# Patient Record
Sex: FEMALE | Race: WHITE | NOT HISPANIC OR LATINO | Employment: FULL TIME | URBAN - METROPOLITAN AREA
[De-identification: names, ages, dates, MRNs, and addresses within clinical notes are randomized per-mention and may not be internally consistent; named-entity substitution may affect disease eponyms.]

---

## 2020-09-01 ENCOUNTER — PROCEDURE VISIT (OUTPATIENT)
Dept: OBSTETRICS AND GYNECOLOGY | Age: 55
End: 2020-09-01

## 2020-09-01 ENCOUNTER — OFFICE VISIT (OUTPATIENT)
Dept: OBSTETRICS AND GYNECOLOGY | Age: 55
End: 2020-09-01

## 2020-09-01 VITALS
SYSTOLIC BLOOD PRESSURE: 122 MMHG | HEIGHT: 63 IN | WEIGHT: 190.4 LBS | BODY MASS INDEX: 33.73 KG/M2 | DIASTOLIC BLOOD PRESSURE: 76 MMHG

## 2020-09-01 DIAGNOSIS — Z12.4 SCREENING FOR MALIGNANT NEOPLASM OF CERVIX: ICD-10-CM

## 2020-09-01 DIAGNOSIS — N93.9 ABNORMAL UTERINE BLEEDING (AUB): Primary | ICD-10-CM

## 2020-09-01 PROCEDURE — 99203 OFFICE O/P NEW LOW 30 MIN: CPT | Performed by: OBSTETRICS & GYNECOLOGY

## 2020-09-01 PROCEDURE — 58100 BIOPSY OF UTERUS LINING: CPT | Performed by: OBSTETRICS & GYNECOLOGY

## 2020-09-01 PROCEDURE — 76830 TRANSVAGINAL US NON-OB: CPT | Performed by: OBSTETRICS & GYNECOLOGY

## 2020-09-01 RX ORDER — METOPROLOL SUCCINATE 100 MG/1
TABLET, EXTENDED RELEASE ORAL
COMMUNITY
Start: 2020-06-11

## 2020-09-01 RX ORDER — TRAZODONE HYDROCHLORIDE 100 MG/1
TABLET ORAL
COMMUNITY
Start: 2020-07-16

## 2020-09-01 RX ORDER — EXENATIDE 2 MG/.85ML
INJECTION, SUSPENSION, EXTENDED RELEASE SUBCUTANEOUS
COMMUNITY
Start: 2020-07-23

## 2020-09-01 RX ORDER — LEVOTHYROXINE SODIUM 112 UG/1
TABLET ORAL
COMMUNITY
Start: 2020-07-17

## 2020-09-01 RX ORDER — BACLOFEN 10 MG/1
TABLET ORAL
COMMUNITY
Start: 2020-06-11

## 2020-09-01 RX ORDER — SITAGLIPTIN 100 MG/1
TABLET, FILM COATED ORAL
COMMUNITY
Start: 2020-07-16

## 2020-09-01 RX ORDER — DULOXETIN HYDROCHLORIDE 60 MG/1
CAPSULE, DELAYED RELEASE ORAL
COMMUNITY
Start: 2020-06-11

## 2020-09-01 RX ORDER — VENLAFAXINE HYDROCHLORIDE 225 MG/1
TABLET, EXTENDED RELEASE ORAL
COMMUNITY
Start: 2020-06-11

## 2020-09-01 NOTE — PROGRESS NOTES
New GYN Problem    Chief Complaint   Patient presents with   • Gynecologic Exam     NEW GYN ABNORMAL VAGINAL BLEEDING LAST PAP 3/23/17   • Establish Care       Maria L Mcnulty is a 55 yr old G0 who presents with vaginal bleeding. She had some cramping the beginning of August then a week later began having vaginal bleeding.  It is bright red, but it is not very heavy.  She continues to have some mild uterine cramping.  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  Things have been somewhat stressful due to COVID    Review of Systems   Constitutional: Negative for fever and unexpected weight change.   Respiratory: Negative for shortness of breath.    Cardiovascular: Negative for chest pain.   Gastrointestinal: Positive for constipation and diarrhea. Negative for abdominal pain.   Genitourinary: Negative for frequency and urgency.   Hematological: Negative for adenopathy.   Psychiatric/Behavioral: Negative for dysphoric mood.       Health Maintenance  Last mammogram: UTD march 2020- has had benign breast bx.  Last pap: 2017 normal, abnormal pap more than 10 yr ago and LEEP  c-scope 2015    Histories  Past Medical History:   Diagnosis Date   • Diabetes mellitus (CMS/HCC)    • Disease of thyroid gland    • GERD (gastroesophageal reflux disease)    • Hernia, hiatal    • Hyperlipidemia     RESOLVED    • Shoulder pain        Past Surgical History:   Procedure Laterality Date   • BREAST BIOPSY     • CERVICAL BIOPSY  W/ LOOP ELECTRODE EXCISION     • ENDOMETRIAL ABLATION     • THYROID BIOPSY         Family History   Problem Relation Age of Onset   • Uterine cancer Maternal Grandmother    • Ovarian cancer Maternal Grandmother    • Colon cancer Maternal Grandmother    • Breast cancer Paternal Aunt    • Bone cancer Paternal Aunt    • Brain cancer Paternal Aunt        Social History     Socioeconomic History   • Marital status:   "    Spouse name: Not on file   • Number of children: Not on file   • Years of education: Not on file   • Highest education level: Not on file   Tobacco Use   • Smoking status: Former Smoker   • Smokeless tobacco: Never Used   Substance and Sexual Activity   • Alcohol use: Yes     Comment: OCCASION   • Drug use: Never   • Sexual activity: Yes     Partners: Male     Birth control/protection: None       OB History        0    Para   0    Term   0       0    AB   0    Living   0       SAB   0    TAB   0    Ectopic   0    Molar   0    Multiple   0    Live Births   0                Physical Exam    /76   Ht 158.8 cm (62.52\")   Wt 86.4 kg (190 lb 6.4 oz)   LMP  (LMP Unknown)   Breastfeeding No   BMI 34.25 kg/m²     BMI: Body mass index is 34.25 kg/m².     General:   alert, appears stated age and cooperative   Neck Nontender, no lymphadenopathy, no thyromegaly   Lung lungs clear to auscultation, no wheezes or rhonchi   Heart: heart regular rate and rhythm, no murmurs   Abdomen: soft, non-tender, without masses or organomegaly and obese   Breast: inspection negative, no nipple discharge or bleeding, no masses or nodularity palpable   Urethra and bladder: urethral meatus normal; bladder nontender to palpation;   Vulva: normal, Bartholin's, Urethra, Penhook's normal   Vagina: normal mucosa, normal discharge   Cervix: no lesions and nulliparous appearance   Uterus: exam is limited habitus    Adnexa: exam limited by habitus       Informed consent was obtained and risks described as bleeding, cramping, uterine perforation, infection. She desired to proceed. A speculum was inserted and the cervix visualized and cleansed with betadine. A single tooth tenaculum was used to grasp the cervix. The pipelle was inserted and the plunger pulled back. The pipelle was rotated and withdrawn from the uterus with return of small amount of endometrial tissue. A total of two passes were made with the pipelle. The tenaculum " was then removed and hemostasis of the sites ensured.  She had discomfort with the procedure and it was difficult to obtain a good sample with the two passes. She would not tolerate further attempts  Tissue recovered was sent for pathologic examination.    Assessment/Plan    Problems Addressed this Visit     None      Visit Diagnoses     Abnormal uterine bleeding (AUB)    -  Primary    Relevant Orders    Follicle Stimulating Hormone    Estradiol    Reference Histopathology    Screening for malignant neoplasm of cervix        Relevant Orders    PapIG, HPV, Rfx 16 / 18        It is unclear if she is truly menopausal, and so FSH and estradiol ordered today to help determine this.  She has had hot flashes for a couple of years, and she has not had any menstrual bleeding since she had endometrial ablation. Her endometrium was very thin at 3.35 mm and EMB was obtained. She does have risk factors for endometrial cancer. If sampling inadequate, which is likely to be, if truly postmenopausal then recommend D&C  Since lining is very thin, may be able to monitor       Susie Mendoza MD  09/01/2020  11:48

## 2020-09-02 LAB
ESTRADIOL SERPL-MCNC: 33 PG/ML
FSH SERPL-ACNC: 38.1 MIU/ML

## 2020-09-03 LAB
DX ICD CODE: NORMAL
PATH REPORT.FINAL DX SPEC: NORMAL
PATH REPORT.GROSS SPEC: NORMAL
PATH REPORT.SITE OF ORIGIN SPEC: NORMAL
PATHOLOGIST NAME: NORMAL
PAYMENT PROCEDURE: NORMAL

## 2020-09-03 NOTE — PROGRESS NOTES
Attempted to call with results, unable to reach.  Her endometrium was very thin, goes along with having atrophic endometrium.  Might consider doing a course of Provera and would recommend following up in 3 to 4 months to see if her bleeding has resolved.  Also, it does appear as though she is in menopause.    Susie Mendoza MD  9/3/2020  12:34

## 2020-09-04 LAB
CYTOLOGIST CVX/VAG CYTO: NORMAL
CYTOLOGY CVX/VAG DOC CYTO: NORMAL
CYTOLOGY CVX/VAG DOC THIN PREP: NORMAL
DX ICD CODE: NORMAL
HIV 1 & 2 AB SER-IMP: NORMAL
HPV I/H RISK 1 DNA CVX QL PROBE+SIG AMP: NEGATIVE
Lab: NORMAL
OTHER STN SPEC: NORMAL
RECOM F/U CVX/VAG CYTO: NORMAL
STAT OF ADQ CVX/VAG CYTO-IMP: NORMAL

## 2020-09-10 ENCOUNTER — TELEPHONE (OUTPATIENT)
Dept: OBSTETRICS AND GYNECOLOGY | Age: 55
End: 2020-09-10

## 2020-09-10 RX ORDER — MEDROXYPROGESTERONE ACETATE 2.5 MG/1
2.5 TABLET ORAL DAILY
Qty: 30 TABLET | Refills: 3 | Status: SHIPPED | OUTPATIENT
Start: 2020-09-10 | End: 2021-08-10

## 2020-09-10 NOTE — TELEPHONE ENCOUNTER
Called patient and was able to reach her, notified that her endometrial biopsy was benign, there was no hyperplasia or malignancy.  She has not had any further bleeding.  Plan to do a small course of Provera and follow-up for repeat ultrasound visit in 3 months.    Please call her to schedule a follow-up with ultrasound.    Susie Mendoza MD  9/10/2020  17:09

## 2021-04-26 RX ORDER — MEDROXYPROGESTERONE ACETATE 2.5 MG/1
TABLET ORAL
Qty: 30 TABLET | Refills: 2 | OUTPATIENT
Start: 2021-04-26

## 2021-04-26 NOTE — TELEPHONE ENCOUNTER
We had just planned on 3 mo of provera but recommended she follow up regarding the bleeding. Please call patient, recommend follow up visit with ultrasound

## 2021-04-29 ENCOUNTER — TELEPHONE (OUTPATIENT)
Dept: OBSTETRICS AND GYNECOLOGY | Age: 56
End: 2021-04-29

## 2021-04-29 NOTE — TELEPHONE ENCOUNTER
Pt calls requesting medroxyprogesterone prescription be called in. Pt is switching insurance and will not have coverage until June. Pt is completely out of script and has next appt on 06/15/21 please advise

## 2021-08-10 ENCOUNTER — OFFICE VISIT (OUTPATIENT)
Dept: OBSTETRICS AND GYNECOLOGY | Age: 56
End: 2021-08-10

## 2021-08-10 VITALS
SYSTOLIC BLOOD PRESSURE: 120 MMHG | DIASTOLIC BLOOD PRESSURE: 74 MMHG | HEIGHT: 63 IN | WEIGHT: 195.4 LBS | BODY MASS INDEX: 34.62 KG/M2

## 2021-08-10 DIAGNOSIS — Z12.31 SCREENING MAMMOGRAM, ENCOUNTER FOR: Primary | ICD-10-CM

## 2021-08-10 PROCEDURE — 99213 OFFICE O/P EST LOW 20 MIN: CPT | Performed by: OBSTETRICS & GYNECOLOGY

## 2021-08-10 RX ORDER — ATORVASTATIN CALCIUM 40 MG/1
40 TABLET, FILM COATED ORAL DAILY
COMMUNITY
Start: 2021-08-03

## 2021-08-10 RX ORDER — GLIPIZIDE 10 MG/1
10 TABLET ORAL DAILY
COMMUNITY
Start: 2021-06-03 | End: 2022-06-03

## 2021-08-10 RX ORDER — SPIRONOLACTONE 25 MG/1
12.5 TABLET ORAL DAILY
COMMUNITY
Start: 2021-06-03

## 2021-08-10 NOTE — PROGRESS NOTES
Chief Complaint   Patient presents with   • Gynecologic Exam     Follow up PMB with US, pt has no complaints.        Maria L Mcnulty is doing well, no complaints.  She had some postmenopausal bleeding previously, she had endometrial biopsy that was benign and we did a few months of Provera.  She notes that she has not had any further bleeding.  When she stopped taking the Provera once, she had some cramping    A/P  Postmenopausal bleeding    Ultrasound today with very thin endometrial stripe, no further bleeding.  Plan to stop the Provera and observe for now.  Discussed that for further episodes of bleeding to present for evaluation.      Susie Mendoza MD  8/10/2021  09:24 EDT

## 2021-12-21 ENCOUNTER — APPOINTMENT (OUTPATIENT)
Dept: OTHER | Facility: HOSPITAL | Age: 56
End: 2021-12-21

## 2021-12-21 DIAGNOSIS — Z09 FOLLOW UP: ICD-10-CM

## 2021-12-23 ENCOUNTER — HOSPITAL ENCOUNTER (OUTPATIENT)
Dept: MAMMOGRAPHY | Facility: HOSPITAL | Age: 56
End: 2021-12-23

## 2022-01-29 ENCOUNTER — HOSPITAL ENCOUNTER (OUTPATIENT)
Dept: MAMMOGRAPHY | Facility: HOSPITAL | Age: 57
End: 2022-01-29

## 2022-04-21 ENCOUNTER — HOSPITAL ENCOUNTER (OUTPATIENT)
Dept: MAMMOGRAPHY | Facility: HOSPITAL | Age: 57
Discharge: HOME | End: 2022-04-21
Admitting: OBSTETRICS & GYNECOLOGY

## 2022-04-21 DIAGNOSIS — Z12.31 SCREENING MAMMOGRAM, ENCOUNTER FOR: ICD-10-CM

## 2022-04-21 PROCEDURE — 77063 BREAST TOMOSYNTHESIS BI: CPT

## 2022-04-21 PROCEDURE — 77067 SCR MAMMO BI INCL CAD: CPT
